# Patient Record
Sex: MALE
[De-identification: names, ages, dates, MRNs, and addresses within clinical notes are randomized per-mention and may not be internally consistent; named-entity substitution may affect disease eponyms.]

---
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
## 2022-08-08 ENCOUNTER — OFFICE VISIT (OUTPATIENT)
Dept: FAMILY MEDICINE CLINIC | Age: 22
End: 2022-08-08
Payer: COMMERCIAL

## 2022-08-08 VITALS
SYSTOLIC BLOOD PRESSURE: 118 MMHG | HEIGHT: 75 IN | DIASTOLIC BLOOD PRESSURE: 68 MMHG | OXYGEN SATURATION: 99 % | HEART RATE: 66 BPM | WEIGHT: 232.3 LBS | BODY MASS INDEX: 28.88 KG/M2

## 2022-08-08 DIAGNOSIS — Z00.00 WELL ADULT EXAM: Primary | ICD-10-CM

## 2022-08-08 DIAGNOSIS — Z02.5 SPORTS PHYSICAL: ICD-10-CM

## 2022-08-08 PROCEDURE — 99395 PREV VISIT EST AGE 18-39: CPT

## 2022-08-08 SDOH — ECONOMIC STABILITY: FOOD INSECURITY: WITHIN THE PAST 12 MONTHS, THE FOOD YOU BOUGHT JUST DIDN'T LAST AND YOU DIDN'T HAVE MONEY TO GET MORE.: NEVER TRUE

## 2022-08-08 SDOH — ECONOMIC STABILITY: FOOD INSECURITY: WITHIN THE PAST 12 MONTHS, YOU WORRIED THAT YOUR FOOD WOULD RUN OUT BEFORE YOU GOT MONEY TO BUY MORE.: NEVER TRUE

## 2022-08-08 ASSESSMENT — ENCOUNTER SYMPTOMS
BLOOD IN STOOL: 0
DIARRHEA: 0
SHORTNESS OF BREATH: 0
CONSTIPATION: 0
CHEST TIGHTNESS: 0

## 2022-08-08 ASSESSMENT — PATIENT HEALTH QUESTIONNAIRE - PHQ9
SUM OF ALL RESPONSES TO PHQ QUESTIONS 1-9: 0
SUM OF ALL RESPONSES TO PHQ9 QUESTIONS 1 & 2: 0
SUM OF ALL RESPONSES TO PHQ QUESTIONS 1-9: 0
SUM OF ALL RESPONSES TO PHQ QUESTIONS 1-9: 0
1. LITTLE INTEREST OR PLEASURE IN DOING THINGS: 0
2. FEELING DOWN, DEPRESSED OR HOPELESS: 0
SUM OF ALL RESPONSES TO PHQ QUESTIONS 1-9: 0

## 2022-08-08 ASSESSMENT — SOCIAL DETERMINANTS OF HEALTH (SDOH): HOW HARD IS IT FOR YOU TO PAY FOR THE VERY BASICS LIKE FOOD, HOUSING, MEDICAL CARE, AND HEATING?: NOT HARD AT ALL

## 2022-08-08 NOTE — PROGRESS NOTES
Food in the Last Year: Never true   Transportation Needs: Not on file   Physical Activity: Not on file   Stress: Not on file   Social Connections: Not on file   Intimate Partner Violence: Not on file   Housing Stability: Not on file       Prior to Visit Medications    Medication Sig Taking? Authorizing Provider   cetirizine (ZYRTEC) 10 MG tablet Take 10 mg by mouth daily Yes Historical Provider, MD       Allergies   Allergen Reactions    Nuts [Peanut-Containing Drug Products] Shortness Of Breath and Swelling     Including tree nuts    Banana      Stomach upset, acid    Chicken Allergy     Eggs Or Egg-Derived Products Other (See Comments)     Stomach cramps    Other       legumes       OBJECTIVE:    /68   Pulse 66   Ht (!) 6' 3\" (1.905 m)   Wt 232 lb 4.8 oz (105.4 kg)   SpO2 99%   BMI 29.04 kg/m²     BP Readings from Last 2 Encounters:   08/08/22 118/68   06/07/21 (!) 145/88       Wt Readings from Last 3 Encounters:   08/08/22 232 lb 4.8 oz (105.4 kg) (98 %, Z= 2.11)*   06/07/21 175 lb (79.4 kg) (81 %, Z= 0.88)*   05/18/21 177 lb (80.3 kg) (83 %, Z= 0.94)*     * Growth percentiles are based on CDC (Boys, 2-20 Years) data. Physical Exam  Constitutional:       Appearance: Normal appearance. HENT:      Head: Normocephalic and atraumatic. Right Ear: External ear normal.      Left Ear: External ear normal.      Nose: No congestion. Eyes:      Extraocular Movements: Extraocular movements intact. Pupils: Pupils are equal, round, and reactive to light. Cardiovascular:      Pulses: Normal pulses. Heart sounds: Normal heart sounds. No murmur heard. Pulmonary:      Effort: Pulmonary effort is normal.      Breath sounds: Normal breath sounds. No wheezing. Abdominal:      General: Bowel sounds are normal.   Musculoskeletal:         General: Normal range of motion. Cervical back: Normal range of motion and neck supple. Right lower leg: No edema. Left lower leg: No edema. Skin:     General: Skin is warm and dry. Capillary Refill: Capillary refill takes less than 2 seconds. Neurological:      General: No focal deficit present. Mental Status: He is alert and oriented to person, place, and time. Psychiatric:         Mood and Affect: Mood normal.         Behavior: Behavior normal.         ASSESSMENT/PLAN:    Jordan was seen today for annual exam.    Diagnoses and all orders for this visit:    Well adult exam  Normal well adult exam in office today. Patient follow-up in 1 year for routine office visit. Sports physical  Sports physical paperwork filled out and signed with all appropriate documentation. I did not note any findings in office today that would inhibit the patient from participating in baseball. This document was prepared by a combination of typing and transcription through a voice recognition software.     STEVO Perez - CNP

## 2022-12-28 ENCOUNTER — TELEPHONE (OUTPATIENT)
Dept: FAMILY MEDICINE CLINIC | Age: 22
End: 2022-12-28

## 2022-12-28 NOTE — TELEPHONE ENCOUNTER
Patient's dad informed and he will see how patient does tonight and if he needs an appointment he will call back.

## 2022-12-28 NOTE — TELEPHONE ENCOUNTER
Keyla Wei was catching baseball. Was not wearing cup. Hit b/t the legs. Swelling noted. Denies asymmetry. I spoke w/ . If severe pain, rec ER or urgent care. O/w arrange appt. Ice, rest, supportive underwear.   Pls call dad and see how he would like to proceed re possibly being seen

## 2022-12-28 NOTE — TELEPHONE ENCOUNTER
Eliza White  called and would like to discuss a injury that Dora Hernández has. The pt father asked her to call you.  Daniel's father and the  need an opinion

## 2023-05-28 NOTE — PROGRESS NOTES
Kerry Gould [address], [address]  [place], [place]  P: [phone]       Patient     Will Arreguin  2000    ChiefComplaint     Chief Complaint   Patient presents with    Follow-up     Patient here for follow up on cyst removal of left ear. States that everything is good since last visit. Denies any pain or discomfort. History of Present Illness     Susannah Garcia is an 27year-old male here today for follow-up with his dad regarding the left EAC cholesteatoma. Since last seen by me he has had no issues. Denies otalgia, otorrhea, vertigo, tinnitus. Hearing stable. No complaints.     Past Medical History     Past Medical History:   Diagnosis Date    Seasonal allergies        Past Surgical History     Past Surgical History:   Procedure Laterality Date    CYST REMOVAL  2005    NOSE    TYMPANOPLASTY Left 5/27/2020    EXCISION OF LEFT EAR CANAL MASS, POSSIBLE TYMPANOPLASTY, POSSIBLE SKIN GRAFT, POSSIBLE FASCIAL GRAFT performed by Cruzito Levi DO at [address] [place] [address] History     Family History   Problem Relation Age of Onset    Asthma Father     Hypertension Maternal Grandmother     Diabetes Maternal Grandmother     Hypertension Paternal Grandfather     Diabetes Paternal Grandfather        Social History     Social History     Tobacco Use    Smoking status: Never Smoker    Smokeless tobacco: Never Used   Vaping Use    Vaping Use: Never used   Substance Use Topics    Alcohol use: Never     Alcohol/week: 0.0 standard drinks    Drug use: Never        Allergies     Allergies   Allergen Reactions    Nuts [Peanut-Containing Drug Products] Shortness Of Breath and Swelling     Including tree nuts    Banana      Stomach upset, acid    Chicken Allergy     Eggs Or Egg-Derived Products Other (See Comments)     Stomach cramps    Other       legumes       Medications     Current Outpatient Medications   Medication Sig Dispense Refill    cetirizine (ZYRTEC) 10 MG tablet Take 10 mg by mouth daily       No current facility-administered medications for this visit. Review of Systems     Review of Systems   Constitutional: Negative for appetite change, chills, fatigue, fever and unexpected weight change. HENT: Negative for congestion, ear discharge, ear pain, facial swelling, hearing loss, nosebleeds, postnasal drip, sinus pressure, sneezing, sore throat, tinnitus, trouble swallowing and voice change. Eyes: Negative for itching. Respiratory: Negative for apnea, cough and shortness of breath. Endocrine: Negative for cold intolerance and heat intolerance. Musculoskeletal: Negative for myalgias and neck pain. Skin: Negative for rash. Allergic/Immunologic: Negative for environmental allergies. Neurological: Negative for dizziness and headaches. Psychiatric/Behavioral: Negative for confusion, decreased concentration and sleep disturbance. PhysicalExam     Vitals:    05/18/21 0943   Temp: 98.6 °F (37 °C)   Weight: 177 lb (80.3 kg)   Height: (!) 6' 3\" (1.905 m)       Physical Exam  Constitutional:       General: He is not in acute distress. Appearance: He is well-developed. HENT:      Head: Normocephalic and atraumatic. Right Ear: Tympanic membrane, ear canal and external ear normal. No drainage. No middle ear effusion. Tympanic membrane is not bulging. Tympanic membrane has normal mobility. Left Ear: Tympanic membrane, ear canal and external ear normal. No drainage. No middle ear effusion. Tympanic membrane is not bulging. Tympanic membrane has normal mobility. Nose: Septal deviation present. No mucosal edema or rhinorrhea. Mouth/Throat:      Lips: Pink. Mouth: Mucous membranes are moist.      Tongue: No lesions. Palate: No mass. Pharynx: Uvula midline. Tonsils: No tonsillar exudate. 2+ on the right. 2+ on the left. Eyes:      Pupils: Pupils are equal, round, and reactive to light.    Neck:      Thyroid: No thyroid mass or thyromegaly. Trachea: Trachea and phonation normal.   Cardiovascular:      Pulses: Normal pulses. Pulmonary:      Effort: Pulmonary effort is normal. No accessory muscle usage or respiratory distress. Breath sounds: No stridor. Musculoskeletal:      Cervical back: Full passive range of motion without pain. Lymphadenopathy:      Head:      Right side of head: No submental or submandibular adenopathy. Left side of head: No submental or submandibular adenopathy. Cervical: No cervical adenopathy. Right cervical: No superficial, deep or posterior cervical adenopathy. Left cervical: No superficial, deep or posterior cervical adenopathy. Skin:     General: Skin is warm and dry. Neurological:      Mental Status: He is alert and oriented to person, place, and time. Cranial Nerves: No cranial nerve deficit. Coordination: Coordination normal.      Gait: Gait normal.   Psychiatric:         Thought Content: Thought content normal.         Assessment and Plan     1. Cholesteatoma of external auditory canal, left  -no evidence of recurrence at this time  -EAC normal    Return as needed        Ike Last, DO  5/18/21      Portions of this note were dictated using Dragon.  There may be linguistic errors secondary to the use of this program. no No

## 2023-08-22 ENCOUNTER — OFFICE VISIT (OUTPATIENT)
Dept: FAMILY MEDICINE CLINIC | Age: 23
End: 2023-08-22
Payer: COMMERCIAL

## 2023-08-22 VITALS
HEART RATE: 84 BPM | OXYGEN SATURATION: 98 % | DIASTOLIC BLOOD PRESSURE: 72 MMHG | HEIGHT: 75 IN | SYSTOLIC BLOOD PRESSURE: 126 MMHG | WEIGHT: 225 LBS | BODY MASS INDEX: 27.98 KG/M2

## 2023-08-22 DIAGNOSIS — Z02.5 SPORTS PHYSICAL: Primary | ICD-10-CM

## 2023-08-22 DIAGNOSIS — Z00.00 WELL ADULT EXAM: ICD-10-CM

## 2023-08-22 PROCEDURE — 99395 PREV VISIT EST AGE 18-39: CPT | Performed by: FAMILY MEDICINE

## 2023-08-22 RX ORDER — ESOMEPRAZOLE MAGNESIUM 40 MG/1
40 CAPSULE, DELAYED RELEASE ORAL
COMMUNITY

## 2023-08-22 ASSESSMENT — PATIENT HEALTH QUESTIONNAIRE - PHQ9
1. LITTLE INTEREST OR PLEASURE IN DOING THINGS: 0
SUM OF ALL RESPONSES TO PHQ QUESTIONS 1-9: 0
SUM OF ALL RESPONSES TO PHQ QUESTIONS 1-9: 0
SUM OF ALL RESPONSES TO PHQ9 QUESTIONS 1 & 2: 0
2. FEELING DOWN, DEPRESSED OR HOPELESS: 0
SUM OF ALL RESPONSES TO PHQ QUESTIONS 1-9: 0
SUM OF ALL RESPONSES TO PHQ QUESTIONS 1-9: 0

## 2023-08-22 ASSESSMENT — ENCOUNTER SYMPTOMS: SHORTNESS OF BREATH: 0

## 2023-08-22 NOTE — PROGRESS NOTES
Chief Complaint   Patient presents with    Annual Exam       HPI:  Carlos Luther is a 23 y.o. (: 2000) here today   for annual sports physical.  HPI  Sports physical today. Hx of testicular rupture/fracture. Was hit w/ baseball. Had surgical procedure as well. Prior abn sensation has resolved. No sig pain noted. Prior repair of right testicular fracture. O/w overall doing well. Has been going thru rehab on arm. Hx of ucl injury. Had thickening on prior MRI. Plans on eval at [facility]. Had been seeing PT. No hx of concussions, passing out w/ exercise, sob. No fam hx of sudden cardiac death. Still some occas allergy issues. Hx of EE. Supposed to have GI f/u again. No sig issues swallowing noted. Last egd last yr. Dilation 2 yrs ago    Patient's medications, allergies, past medical, surgical, social and family histories were reviewed and updated as appropriate. ROS:  Review of Systems   Constitutional:  Negative for fever. Respiratory:  Negative for shortness of breath. Cardiovascular:  Negative for chest pain and palpitations. Musculoskeletal:  Negative for arthralgias.          No results found for: LABA1C, [facility]    Past Medical History:   Diagnosis Date    Seasonal allergies     Testicular fracture 2022    right       Family History   Problem Relation Age of Onset    Asthma Father     Hypertension Maternal Grandmother     Diabetes Maternal Grandmother     Hypertension Paternal Grandfather     Diabetes Paternal Grandfather        Social History     Socioeconomic History    Marital status: Single     Spouse name: Not on file    Number of children: Not on file    Years of education: Not on file    Highest education level: Not on file   Occupational History    Not on file   Tobacco Use    Smoking status: Never    Smokeless tobacco: Never   Vaping Use    Vaping Use: Never used   Substance and Sexual Activity    Alcohol use: Never     Alcohol/week: 0.0

## 2024-05-22 ENCOUNTER — OFFICE VISIT (OUTPATIENT)
Dept: FAMILY MEDICINE CLINIC | Age: 24
End: 2024-05-22
Payer: COMMERCIAL

## 2024-05-22 VITALS
OXYGEN SATURATION: 98 % | BODY MASS INDEX: 27.87 KG/M2 | WEIGHT: 223 LBS | DIASTOLIC BLOOD PRESSURE: 82 MMHG | SYSTOLIC BLOOD PRESSURE: 134 MMHG | HEART RATE: 80 BPM

## 2024-05-22 DIAGNOSIS — Z01.818 PREOP EXAMINATION: Primary | ICD-10-CM

## 2024-05-22 DIAGNOSIS — S53.441A TEAR OF ULNAR COLLATERAL LIGAMENT OF RIGHT ELBOW, INITIAL ENCOUNTER: ICD-10-CM

## 2024-05-22 LAB
ANION GAP SERPL CALCULATED.3IONS-SCNC: 11 MMOL/L (ref 3–16)
BUN SERPL-MCNC: 18 MG/DL (ref 7–20)
CALCIUM SERPL-MCNC: 10 MG/DL (ref 8.3–10.6)
CHLORIDE SERPL-SCNC: 101 MMOL/L (ref 99–110)
CO2 SERPL-SCNC: 27 MMOL/L (ref 21–32)
CREAT SERPL-MCNC: 1.1 MG/DL (ref 0.9–1.3)
GFR SERPLBLD CREATININE-BSD FMLA CKD-EPI: >90 ML/MIN/{1.73_M2}
GLUCOSE SERPL-MCNC: 81 MG/DL (ref 70–99)
POTASSIUM SERPL-SCNC: 4.5 MMOL/L (ref 3.5–5.1)
SODIUM SERPL-SCNC: 139 MMOL/L (ref 136–145)

## 2024-05-22 PROCEDURE — 99214 OFFICE O/P EST MOD 30 MIN: CPT

## 2024-05-22 RX ORDER — LANSOPRAZOLE 30 MG/1
30 CAPSULE, DELAYED RELEASE ORAL DAILY
COMMUNITY

## 2024-05-22 ASSESSMENT — PATIENT HEALTH QUESTIONNAIRE - PHQ9
1. LITTLE INTEREST OR PLEASURE IN DOING THINGS: NOT AT ALL
SUM OF ALL RESPONSES TO PHQ QUESTIONS 1-9: 0
SUM OF ALL RESPONSES TO PHQ QUESTIONS 1-9: 0
2. FEELING DOWN, DEPRESSED OR HOPELESS: NOT AT ALL
SUM OF ALL RESPONSES TO PHQ QUESTIONS 1-9: 0
SUM OF ALL RESPONSES TO PHQ QUESTIONS 1-9: 0
SUM OF ALL RESPONSES TO PHQ9 QUESTIONS 1 & 2: 0

## 2024-05-22 ASSESSMENT — ENCOUNTER SYMPTOMS
RESPIRATORY NEGATIVE: 1
GASTROINTESTINAL NEGATIVE: 1

## 2024-05-22 NOTE — PROGRESS NOTES
Jordan Webster  YOB: 2000    Date of Service:  5/22/2024      Wt Readings from Last 2 Encounters:   05/22/24 101.2 kg (223 lb)   08/22/23 102.1 kg (225 lb)       BP Readings from Last 3 Encounters:   05/22/24 134/82   08/22/23 126/72   08/08/22 118/68        Chief Complaint   Patient presents with    Pre-op Exam     Right Elbow by Dr Cao on 5/31/24 at [facility] [address]        Allergies   Allergen Reactions    Ibuprofen Anaphylaxis    Nuts [Peanut-Containing Drug Products] Shortness Of Breath and Swelling     Including tree nuts    Banana      Stomach upset, acid    Chicken Allergy     Egg-Derived Products Other (See Comments)     Stomach cramps    Other       legumes       Outpatient Medications Marked as Taking for the 5/22/24 encounter (Office Visit) with Dorian Guillen APRN - CNP   Medication Sig Dispense Refill    lansoprazole (PREVACID) 30 MG delayed release capsule Take 1 capsule by mouth daily         This patient presents to the office today for a preoperative consultation  at the request of surgeon, Dr. Cao, who plans on performing right ulnar collateral ligament repair on May 31th at [facility].  The current problem began1 year ago, and symptoms have been worsening with time.  Conservative therapy: Therapy.    Planned anesthesia: General   Known anesthesia problems: None   Bleeding risk: No recent or remote history of abnormal bleeding  Personal or FH of DVT/PE: No    Patient objection to receiving blood products: No    Past Medical History:   Diagnosis Date    Seasonal allergies     Testicular fracture 12/27/2022    right       Past Surgical History:   Procedure Laterality Date    CYST REMOVAL  2005    NOSE    ESOPHAGOGASTRODUODENOSCOPY      several.  dilation 2021    TESTICLE SURGERY  12/2022    TYMPANOPLASTY Left 05/27/2020    EXCISION OF LEFT EAR CANAL MASS, POSSIBLE TYMPANOPLASTY, POSSIBLE SKIN GRAFT, POSSIBLE FASCIAL GRAFT performed by Sirisha

## 2024-08-28 ENCOUNTER — TELEMEDICINE (OUTPATIENT)
Dept: FAMILY MEDICINE CLINIC | Age: 24
End: 2024-08-28
Payer: COMMERCIAL

## 2024-08-28 DIAGNOSIS — J34.89 NASAL DRYNESS: Primary | ICD-10-CM

## 2024-08-28 PROCEDURE — 99213 OFFICE O/P EST LOW 20 MIN: CPT

## 2024-08-28 SDOH — ECONOMIC STABILITY: INCOME INSECURITY: HOW HARD IS IT FOR YOU TO PAY FOR THE VERY BASICS LIKE FOOD, HOUSING, MEDICAL CARE, AND HEATING?: NOT HARD AT ALL

## 2024-08-28 SDOH — ECONOMIC STABILITY: FOOD INSECURITY: WITHIN THE PAST 12 MONTHS, YOU WORRIED THAT YOUR FOOD WOULD RUN OUT BEFORE YOU GOT MONEY TO BUY MORE.: NEVER TRUE

## 2024-08-28 SDOH — ECONOMIC STABILITY: FOOD INSECURITY: WITHIN THE PAST 12 MONTHS, THE FOOD YOU BOUGHT JUST DIDN'T LAST AND YOU DIDN'T HAVE MONEY TO GET MORE.: NEVER TRUE

## 2024-08-28 NOTE — PROGRESS NOTES
Jordan Webster, was evaluated through a synchronous (real-time) audio-video encounter. The patient (or guardian if applicable) is aware that this is a billable service, which includes applicable co-pays. This Virtual Visit was conducted with patient's (and/or legal guardian's) consent. Patient identification was verified, and a caregiver was present when appropriate.   The patient was located at Home: [address] [place] [address] [address]  [place] [address]  Provider was located at Home (Appt Dept State): [place]  Confirm you are appropriately licensed, registered, or certified to deliver care in the state where the patient is located as indicated above. If you are not or unsure, please re-schedule the visit: Yes, I confirm.     Jordan Webster (:  2000) is a Established patient, presenting virtually for evaluation of the following:    Patient complaining of dry sinuses x 2 weeks.  Has stopped using his antihistamine and sinus sprays roughly 1 week ago to reduce further drying out his sinus cavity.  Has been using saline sinus sprays.  Denies any sinus pressure, fatigue, headache, fever, ear pain, sore throat.  States he is breathing completely normal but just has dry sinus cavity.  Patient questioning if we have any advice on how to help moisten the sinuses.      Below is the assessment and plan developed based on review of pertinent history, physical exam, labs, studies, and medications.     Assessment & Plan  Nasal dryness    At this time I recommended the patient continue to hold off on taking his antihistamine tablets and sinus sprays.  I did state that he could use his saline sinus rinses as needed.  I recommended adding use of a humidifier at nighttime during sleep as well as during the day if able in his workplace environment to help moisten the sinuses.  Did not appear the patient was exhibiting sinusitis symptoms given the fact he had no other symptoms other than dry sinus cavity.  Advised measures performed  recognition software.    --Dorian Guillen, STEVO - CNP

## 2024-11-04 ENCOUNTER — OFFICE VISIT (OUTPATIENT)
Dept: FAMILY MEDICINE CLINIC | Age: 24
End: 2024-11-04

## 2024-11-04 VITALS
BODY MASS INDEX: 29 KG/M2 | DIASTOLIC BLOOD PRESSURE: 76 MMHG | HEIGHT: 75 IN | WEIGHT: 233.2 LBS | SYSTOLIC BLOOD PRESSURE: 114 MMHG | HEART RATE: 84 BPM | OXYGEN SATURATION: 98 %

## 2024-11-04 DIAGNOSIS — R68.2 DRY MOUTH: Primary | ICD-10-CM

## 2024-11-04 DIAGNOSIS — S31.30XS RUPTURE OF TESTIS, SEQUELA: ICD-10-CM

## 2024-11-04 DIAGNOSIS — J34.89 NOSE DRYNESS: ICD-10-CM

## 2024-11-04 ASSESSMENT — ENCOUNTER SYMPTOMS: SHORTNESS OF BREATH: 0

## 2024-11-04 NOTE — PROGRESS NOTES
Chief Complaint   Patient presents with    dry mouth       HPI:  Jordan Webster is a 23 y.o. (: 2000) here today   for dry mouth and sinuses.  HPI  Was seen as virtual visit few mo ago.  Has had issues w/ sinuses, throat too dry.  Tried humidifier, stopped zyrtec, inc water intake.  No improvement.  Nose really dry.  Feels as if \"no filter.\"  Some sig dryness to throat noted w/ activity.  Has been off zyrtec approx 3 mo.  Thought was window air conditioner in the past, but sxs have not improved since not using.  No sig dryness to eyes.  Does seem hot all the time.  Has tried saline nasal sprays/ rinse, others.      Prior elbow surgery.  Still w/ swelling.  Had been on prednisone after surgery as well.     Patient's medications, allergies, past medical, surgical, social and family histories were reviewed and updated as appropriate.    ROS:  Review of Systems   Constitutional:  Negative for fever.   Respiratory:  Negative for shortness of breath.    Cardiovascular:  Negative for chest pain and palpitations.   Endocrine: Positive for heat intolerance.   Musculoskeletal:  Positive for arthralgias.           No results found for: \"LABA1C\", \"LDLDIRECT\"    Past Medical History:   Diagnosis Date    Seasonal allergies     Testicular fracture 2022    right       Family History   Problem Relation Age of Onset    Asthma Father     Hypertension Maternal Grandmother     Diabetes Maternal Grandmother     Hypertension Paternal Grandfather     Diabetes Paternal Grandfather        Social History     Socioeconomic History    Marital status: Single     Spouse name: Not on file    Number of children: Not on file    Years of education: Not on file    Highest education level: Not on file   Occupational History    Not on file   Tobacco Use    Smoking status: Never    Smokeless tobacco: Never   Vaping Use    Vaping status: Never Used   Substance and Sexual Activity    Alcohol use: Never     Alcohol/week: 0.0 standard

## 2024-11-08 ENCOUNTER — LAB (OUTPATIENT)
Dept: FAMILY MEDICINE CLINIC | Age: 24
End: 2024-11-08

## 2024-11-08 DIAGNOSIS — S31.30XS RUPTURE OF TESTIS, SEQUELA: ICD-10-CM

## 2024-11-08 DIAGNOSIS — R68.2 DRY MOUTH: ICD-10-CM

## 2024-11-08 DIAGNOSIS — J34.89 NOSE DRYNESS: ICD-10-CM

## 2024-11-08 LAB
ALBUMIN SERPL-MCNC: 4.3 G/DL (ref 3.4–5)
ALBUMIN/GLOB SERPL: 1.8 {RATIO} (ref 1.1–2.2)
ALP SERPL-CCNC: 91 U/L (ref 40–129)
ALT SERPL-CCNC: 29 U/L (ref 10–40)
ANION GAP SERPL CALCULATED.3IONS-SCNC: 9 MMOL/L (ref 3–16)
AST SERPL-CCNC: 26 U/L (ref 15–37)
BASOPHILS # BLD: 0.1 K/UL (ref 0–0.2)
BASOPHILS NFR BLD: 1.3 %
BILIRUB SERPL-MCNC: 0.3 MG/DL (ref 0–1)
BUN SERPL-MCNC: 16 MG/DL (ref 7–20)
CALCIUM SERPL-MCNC: 9.9 MG/DL (ref 8.3–10.6)
CHLORIDE SERPL-SCNC: 105 MMOL/L (ref 99–110)
CO2 SERPL-SCNC: 26 MMOL/L (ref 21–32)
CREAT SERPL-MCNC: 1.1 MG/DL (ref 0.9–1.3)
CRP SERPL-MCNC: <3 MG/L (ref 0–5.1)
DEPRECATED RDW RBC AUTO: 13 % (ref 12.4–15.4)
EOSINOPHIL # BLD: 0.3 K/UL (ref 0–0.6)
EOSINOPHIL NFR BLD: 5.3 %
ERYTHROCYTE [SEDIMENTATION RATE] IN BLOOD BY WESTERGREN METHOD: 2 MM/HR (ref 0–15)
GFR SERPLBLD CREATININE-BSD FMLA CKD-EPI: >90 ML/MIN/{1.73_M2}
GLUCOSE SERPL-MCNC: 129 MG/DL (ref 70–99)
HCT VFR BLD AUTO: 41.9 % (ref 40.5–52.5)
HGB BLD-MCNC: 14 G/DL (ref 13.5–17.5)
LYMPHOCYTES # BLD: 1.9 K/UL (ref 1–5.1)
LYMPHOCYTES NFR BLD: 31.9 %
MCH RBC QN AUTO: 28.5 PG (ref 26–34)
MCHC RBC AUTO-ENTMCNC: 33.4 G/DL (ref 31–36)
MCV RBC AUTO: 85.2 FL (ref 80–100)
MONOCYTES # BLD: 0.4 K/UL (ref 0–1.3)
MONOCYTES NFR BLD: 6.2 %
NEUTROPHILS # BLD: 3.3 K/UL (ref 1.7–7.7)
NEUTROPHILS NFR BLD: 55.3 %
PLATELET # BLD AUTO: 221 K/UL (ref 135–450)
PMV BLD AUTO: 9.3 FL (ref 5–10.5)
POTASSIUM SERPL-SCNC: 4.4 MMOL/L (ref 3.5–5.1)
PROT SERPL-MCNC: 6.7 G/DL (ref 6.4–8.2)
RBC # BLD AUTO: 4.92 M/UL (ref 4.2–5.9)
SODIUM SERPL-SCNC: 140 MMOL/L (ref 136–145)
T4 FREE SERPL-MCNC: 1.2 NG/DL (ref 0.9–1.8)
TSH SERPL DL<=0.005 MIU/L-ACNC: 1.6 UIU/ML (ref 0.27–4.2)
WBC # BLD AUTO: 5.9 K/UL (ref 4–11)

## 2024-11-08 NOTE — PROGRESS NOTES
Blood drawn per order.  Needle size: 21 g  Site: L Antecubital.  First attempt successful Yes    Second attempt no    Pressure applied until bleeding stopped.  Pressure applied.    Patient informed to call office or return if bleeding reoccurs and unable to stop.    Tubes drawn: 1 purple     3 red

## 2024-11-09 LAB — ANA SER QL IA: NEGATIVE

## 2024-11-13 LAB
SHBG SERPL-SCNC: 19 NMOL/L (ref 10–60)
TESTOST FREE SERPL-MCNC: 79 PG/ML (ref 47–244)
TESTOST SERPL-MCNC: 303 NG/DL (ref 249–836)

## 2024-11-27 ENCOUNTER — OFFICE VISIT (OUTPATIENT)
Dept: ENT CLINIC | Age: 24
End: 2024-11-27
Payer: COMMERCIAL

## 2024-11-27 VITALS
BODY MASS INDEX: 27.73 KG/M2 | WEIGHT: 223 LBS | HEART RATE: 83 BPM | HEIGHT: 75 IN | DIASTOLIC BLOOD PRESSURE: 77 MMHG | SYSTOLIC BLOOD PRESSURE: 151 MMHG

## 2024-11-27 DIAGNOSIS — R68.2 DRY MOUTH: ICD-10-CM

## 2024-11-27 DIAGNOSIS — H61.21 IMPACTED CERUMEN OF RIGHT EAR: ICD-10-CM

## 2024-11-27 DIAGNOSIS — J34.89 DRY NOSE: Primary | ICD-10-CM

## 2024-11-27 PROCEDURE — 69210 REMOVE IMPACTED EAR WAX UNI: CPT | Performed by: OTOLARYNGOLOGY

## 2024-11-27 PROCEDURE — 99203 OFFICE O/P NEW LOW 30 MIN: CPT | Performed by: OTOLARYNGOLOGY

## 2024-11-27 RX ORDER — MELOXICAM 15 MG/1
15 TABLET ORAL DAILY
COMMUNITY
Start: 2024-11-04

## 2024-11-27 RX ORDER — PANTOPRAZOLE SODIUM 40 MG/1
40 TABLET, DELAYED RELEASE ORAL DAILY
COMMUNITY

## 2024-11-27 ASSESSMENT — ENCOUNTER SYMPTOMS
SINUS PRESSURE: 0
APNEA: 0
FACIAL SWELLING: 0
VOICE CHANGE: 0
COUGH: 0
EYE ITCHING: 0
SHORTNESS OF BREATH: 0
SORE THROAT: 0
TROUBLE SWALLOWING: 0

## 2024-11-27 NOTE — PROGRESS NOTES
[facility]  [address], [address]  [place] [address]  P: [phone]       Patient     Jordan Webster  2000    ChiefComplaint     Chief Complaint   Patient presents with    Sinus Problem     Started late July, feel like nose is dry, dry mouth.  Tried nasal sprays, humidfier, allergy meds. Does have EOE       History of Present Illness     Daniel is a 23-year-old male here today with his dad for evaluation of dry nose and dry mouth.  Started in July 2024 no new medications.  Has tried nasal saline spray medicated nasal sprays all without improvement.  Has been using humidifier of dryness is better in the morning worsens throughout the day had noticed worsening with preworkout has stopped this and has had some improvement in symptoms.  Drinks approximately 60 ounces of water per day.  Did have RADHA screening done which was negative.    Past Medical History     Past Medical History:   Diagnosis Date    Seasonal allergies     Testicular fracture 12/27/2022    right       Past Surgical History     Past Surgical History:   Procedure Laterality Date    CYST REMOVAL  2005    NOSE    ELBOW SURGERY Right 05/2024    ESOPHAGOGASTRODUODENOSCOPY      several.  dilation 2021    TESTICLE SURGERY  12/2022    TYMPANOPLASTY Left 05/27/2020    EXCISION OF LEFT EAR CANAL MASS, POSSIBLE TYMPANOPLASTY, POSSIBLE SKIN GRAFT, POSSIBLE FASCIAL GRAFT performed by Sirisha Turner DO at [facility] OR       Family History     Family History   Problem Relation Age of Onset    Asthma Father     Hypertension Maternal Grandmother     Diabetes Maternal Grandmother     Hypertension Paternal Grandfather     Diabetes Paternal Grandfather        Social History     Social History     Tobacco Use    Smoking status: Never    Smokeless tobacco: Never   Vaping Use    Vaping status: Never Used   Substance Use Topics    Alcohol use: Never     Alcohol/week: 0.0 standard drinks of alcohol    Drug use: Never        Allergies     Allergies

## 2024-12-05 ENCOUNTER — OFFICE VISIT (OUTPATIENT)
Dept: FAMILY MEDICINE CLINIC | Age: 24
End: 2024-12-05

## 2024-12-05 VITALS
TEMPERATURE: 98.3 F | WEIGHT: 232.6 LBS | DIASTOLIC BLOOD PRESSURE: 72 MMHG | HEART RATE: 83 BPM | HEIGHT: 75 IN | SYSTOLIC BLOOD PRESSURE: 124 MMHG | BODY MASS INDEX: 28.92 KG/M2 | OXYGEN SATURATION: 98 %

## 2024-12-05 DIAGNOSIS — Z01.818 PREOP EXAMINATION: Primary | ICD-10-CM

## 2024-12-05 DIAGNOSIS — K21.9 GASTROESOPHAGEAL REFLUX DISEASE WITHOUT ESOPHAGITIS: ICD-10-CM

## 2024-12-05 DIAGNOSIS — S53.31XD TRAUMATIC RUPTURE OF RIGHT ULNAR COLLATERAL LIGAMENT, SUBSEQUENT ENCOUNTER: ICD-10-CM

## 2024-12-05 DIAGNOSIS — M25.521 PAIN IN RIGHT ELBOW: ICD-10-CM

## 2024-12-05 DIAGNOSIS — Z91.018 NUT ALLERGY: ICD-10-CM

## 2024-12-05 DIAGNOSIS — G56.21 ULNAR NERVE ENTRAPMENT AT ELBOW, RIGHT: ICD-10-CM

## 2024-12-05 RX ORDER — EPINEPHRINE 0.3 MG/.3ML
0.3 INJECTION SUBCUTANEOUS PRN
Qty: 2 EACH | Refills: 1 | Status: SHIPPED | OUTPATIENT
Start: 2024-12-05

## 2024-12-05 RX ORDER — EPINEPHRINE 0.3 MG/.3ML
0.3 INJECTION SUBCUTANEOUS PRN
COMMUNITY
End: 2024-12-05 | Stop reason: SDUPTHER

## 2024-12-05 NOTE — PROGRESS NOTES
Jordan Webster (:  2000) is a 23 y.o. male,Established patient, here for evaluation of the following chief complaint(s):  No chief complaint on file.         Assessment & Plan  Preop examination    Scheduled for revision of right elbow previous Tom Hosea surgery has developed ulnar nerve entrapment.  2024. Low risk orthopedic procedure.          Pain in right elbow    Surgery scheduled 2024 no medical contraindications         Ulnar nerve entrapment at elbow, right    Surgery scheduled 2024 no medical contraindications         Traumatic rupture of right ulnar collateral ligament, subsequent encounter    Surgery scheduled 2024 no medical contraindications         Gastroesophageal reflux disease without esophagitis   Chronic, at goal (stable), continue pantoprazole         Nut allergy    Renewed EpiPen    Orders:    EPINEPHrine (EPIPEN 2-NIMISHA) 0.3 MG/0.3ML SOAJ injection; Inject 0.3 mLs into the muscle as needed (for anaphylaxis with food allergy) Use as directed for allergic reaction      No follow-ups on file.       Subjective   HPI  23 year old male presents for preop evaluation. Revision right elbow, nerve entrapment. Previous \"Tom Hosea surgery\"  No recent illness. No changes to medical history.   Review of Systems   No fevers chills headache chest pain shortness of breath nausea vomiting diarrhea constipation change in bowel or bladder function    Objective   Physical Exam  Vitals reviewed.   HENT:      Head: Normocephalic and atraumatic.      Nose: Nose normal.      Mouth/Throat:      Mouth: Mucous membranes are moist.      Pharynx: Oropharynx is clear.   Eyes:      Extraocular Movements: Extraocular movements intact.      Conjunctiva/sclera: Conjunctivae normal.   Neck:      Comments: Trachea midline  Cardiovascular:      Rate and Rhythm: Normal rate and regular rhythm.      Pulses: Normal pulses.      Heart sounds: Normal heart sounds.   Pulmonary:      Effort:

## 2025-07-02 ENCOUNTER — OFFICE VISIT (OUTPATIENT)
Dept: FAMILY MEDICINE CLINIC | Age: 25
End: 2025-07-02

## 2025-07-02 VITALS
WEIGHT: 230 LBS | HEART RATE: 98 BPM | SYSTOLIC BLOOD PRESSURE: 134 MMHG | DIASTOLIC BLOOD PRESSURE: 72 MMHG | OXYGEN SATURATION: 98 % | HEIGHT: 75 IN | BODY MASS INDEX: 28.6 KG/M2

## 2025-07-02 DIAGNOSIS — H60.332 ACUTE SWIMMER'S EAR OF LEFT SIDE: Primary | ICD-10-CM

## 2025-07-02 RX ORDER — CLOMIPHENE CITRATE 50 MG/1
25 TABLET ORAL DAILY
COMMUNITY

## 2025-07-02 RX ORDER — NEOMYCIN SULFATE, POLYMYXIN B SULFATE AND HYDROCORTISONE 3.5; 10000; 1 MG/ML; [IU]/ML; MG/ML
4 SOLUTION AURICULAR (OTIC) 3 TIMES DAILY
Qty: 10 ML | Refills: 0 | Status: SHIPPED | OUTPATIENT
Start: 2025-07-02 | End: 2025-07-12

## 2025-07-02 SDOH — ECONOMIC STABILITY: FOOD INSECURITY: WITHIN THE PAST 12 MONTHS, YOU WORRIED THAT YOUR FOOD WOULD RUN OUT BEFORE YOU GOT MONEY TO BUY MORE.: NEVER TRUE

## 2025-07-02 SDOH — ECONOMIC STABILITY: FOOD INSECURITY: WITHIN THE PAST 12 MONTHS, THE FOOD YOU BOUGHT JUST DIDN'T LAST AND YOU DIDN'T HAVE MONEY TO GET MORE.: NEVER TRUE

## 2025-07-02 ASSESSMENT — PATIENT HEALTH QUESTIONNAIRE - PHQ9
SUM OF ALL RESPONSES TO PHQ QUESTIONS 1-9: 0
SUM OF ALL RESPONSES TO PHQ QUESTIONS 1-9: 0
2. FEELING DOWN, DEPRESSED OR HOPELESS: NOT AT ALL
SUM OF ALL RESPONSES TO PHQ QUESTIONS 1-9: 0
SUM OF ALL RESPONSES TO PHQ QUESTIONS 1-9: 0
1. LITTLE INTEREST OR PLEASURE IN DOING THINGS: NOT AT ALL

## 2025-07-02 NOTE — PROGRESS NOTES
Jordan Webster (:  2000) is a 24 y.o. male,Established patient, here for evaluation of the following chief complaint(s):  Ear Pain (Left)           Assessment & Plan  Acute swimmer's ear of left side   Use eardrops as prescribed.  You may use a wick if it is not making adequate contact to the ear canal.  If symptoms persist follow-up here or with ENT.    Orders:    neomycin-polymyxin-hydrocortisone (CORTISPORIN) 3.5-89911-3 otic solution; Place 4 drops into the left ear 3 times daily for 10 days Instill into left Ear        ICD-10-CM    1. Acute swimmer's ear of left side  H60.332 neomycin-polymyxin-hydrocortisone (CORTISPORIN) 3.5-40947-0 otic solution        Return if symptoms worsen or fail to improve.       Subjective   HPI  24-year-old male presents with 1 day history of left ear pain.  Started last evening worse this morning.  He has treated it with ibuprofen.  Indicates he went swimming for the first time in a long time yesterday he did get water in the ear.  Does have a history of previous cyst in that ear.    Prior to Visit Medications    Medication Sig Taking? Authorizing Provider   clomiPHENE (CLOMID) 50 MG tablet Take 0.5 tablets by mouth daily Yes Alysia Felder MD   neomycin-polymyxin-hydrocortisone (CORTISPORIN) 3.5-53780-7 otic solution Place 4 drops into the left ear 3 times daily for 10 days Instill into left Ear Yes Jamey Christianson MD   EPINEPHrine (EPIPEN 2-NIMISHA) 0.3 MG/0.3ML SOAJ injection Inject 0.3 mLs into the muscle as needed (for anaphylaxis with food allergy) Use as directed for allergic reaction Yes Jamey Christianson MD   pantoprazole (PROTONIX) 40 MG tablet Take 1 tablet by mouth daily Yes Alysia Felder MD      Review of Systems   Constitutional:  Negative for chills and fever.          Objective   Vitals:    25 1018   BP: 134/72   Pulse: 98   SpO2: 98%     Physical Exam  Constitutional:       Appearance: Normal appearance.   HENT:      Head: Normocephalic

## 2025-07-28 ENCOUNTER — OFFICE VISIT (OUTPATIENT)
Dept: FAMILY MEDICINE CLINIC | Age: 25
End: 2025-07-28
Payer: COMMERCIAL

## 2025-07-28 VITALS
OXYGEN SATURATION: 98 % | HEART RATE: 70 BPM | SYSTOLIC BLOOD PRESSURE: 137 MMHG | HEIGHT: 75 IN | DIASTOLIC BLOOD PRESSURE: 77 MMHG | BODY MASS INDEX: 28.6 KG/M2 | RESPIRATION RATE: 20 BRPM | WEIGHT: 230 LBS

## 2025-07-28 DIAGNOSIS — Z00.00 ENCOUNTER FOR WELL ADULT EXAM WITHOUT ABNORMAL FINDINGS: Primary | ICD-10-CM

## 2025-07-28 PROCEDURE — 99395 PREV VISIT EST AGE 18-39: CPT | Performed by: FAMILY MEDICINE

## 2025-07-28 RX ORDER — LANSOPRAZOLE 30 MG/1
TABLET, ORALLY DISINTEGRATING, DELAYED RELEASE ORAL
COMMUNITY
Start: 2025-05-16

## 2025-07-28 ASSESSMENT — ENCOUNTER SYMPTOMS
DIARRHEA: 0
SHORTNESS OF BREATH: 0
NAUSEA: 0
VOMITING: 0
CONSTIPATION: 0

## 2025-07-28 NOTE — PROGRESS NOTES
Jordan Webster (:  2000) is a 24 y.o. male,Established patient, here for evaluation of the following chief complaint(s):  Annual Exam           Assessment & Plan  Encounter for well adult exam without abnormal findings   Patient is playing college baseball.  Is here for sports physical.  No complaints at this time.  Reviewed medical surgical social family history.  Cleared to proceed with the exception of he still needs orthopedic clearance for his right elbow following surgical repair.             ICD-10-CM    1. Encounter for well adult exam without abnormal findings  Z00.00         Return if symptoms worsen or fail to improve.       Subjective   HPI  24-year-old male is seeking clearance to resume baseball in college.  He has been out for the past 2 years due to elbow injury.  Pending final clearance by orthopedics.  He has no acute complaints today    Prior to Visit Medications    Medication Sig Taking? Authorizing Provider   lansoprazole (PREVACID SOLUTAB) 30 MG disintegrating tablet  Yes ProviderAlysia MD   clomiPHENE (CLOMID) 50 MG tablet Take 0.5 tablets by mouth daily Yes Provider, MD Alysia   EPINEPHrine (EPIPEN 2-NIMISHA) 0.3 MG/0.3ML SOAJ injection Inject 0.3 mLs into the muscle as needed (for anaphylaxis with food allergy) Use as directed for allergic reaction Yes Jamey Christianson MD      Review of Systems   Constitutional:  Negative for chills and fever.   Respiratory:  Negative for shortness of breath.    Cardiovascular:  Negative for chest pain.   Gastrointestinal:  Negative for constipation, diarrhea, nausea and vomiting.   Genitourinary:  Negative for dysuria.   Skin:  Negative for rash.   Neurological:  Negative for headaches.          Objective   Vitals:    25 1504   BP: 137/77   Pulse: 70   Resp: 20   SpO2: 98%     Physical Exam  Constitutional:       Appearance: Normal appearance.   HENT:      Head: Normocephalic and atraumatic.   Eyes:      Extraocular Movements:

## (undated) DEVICE — MAGNETIC DRAPE: Brand: DEVON

## (undated) DEVICE — SOLUTION IV 1000ML 0.9% SOD CHL

## (undated) DEVICE — INTEGRA® JARIT® FRAZIER SUCTION TUBE, 7-1/4", ANGLED, 12 FRENCH, 4" WORKING LENGTH: Brand: INTEGRA® JARIT®

## (undated) DEVICE — DRAPE: MAGNETIC 12X16 30/CS: Brand: MEDICAL ACTION INDUSTRIES

## (undated) DEVICE — PACK,EENT,TURBAN DRAPE,PK II: Brand: MEDLINE

## (undated) DEVICE — BANDAGE,ADHESIVE,PLASTIC,1"X3",ST,LF: Brand: MEDLINE

## (undated) DEVICE — ELECTROSURGICAL PENCIL ROCKER SWITCH NON COATED BLADE ELECTRODE 10 FT (3 M) CORD HOLSTER: Brand: MEGADYNE

## (undated) DEVICE — TRAY PREP DRY W/ PREM GLV 2 APPL 6 SPNG 2 UNDPD 1 OVERWRAP

## (undated) DEVICE — SURGIFOAM SPNG SZ 12-7

## (undated) DEVICE — SYRINGE 60ML BULB IRRIG ST LF

## (undated) DEVICE — INTENDED FOR TISSUE SEPARATION, AND OTHER PROCEDURES THAT REQUIRE A SHARP SURGICAL BLADE TO PUNCTURE OR CUT.: Brand: BARD-PARKER ® CARBON RIB-BACK BLADES

## (undated) DEVICE — BALL COT M DIA1IN WHT

## (undated) DEVICE — GLOVE SURG SZ 6 THK91MIL LTX FREE SYN POLYISOPRENE ANTI

## (undated) DEVICE — Z INACTIVE USE 2735373 APPLICATOR FBR LAIN COT WOOD TIP ECONOMICAL

## (undated) DEVICE — GOWN SIRUS NONREIN XL W/TWL: Brand: MEDLINE INDUSTRIES, INC.

## (undated) DEVICE — SUTURE NONABSORBABLE MONOFILAMENT 5-0 PS-2 18 IN BLK ETHILON 1666H

## (undated) DEVICE — SHEET,DRAPE,53X77,STERILE: Brand: MEDLINE

## (undated) DEVICE — MINOR SET UP: Brand: MEDLINE INDUSTRIES, INC.

## (undated) DEVICE — TELFA NON-ADHERENT ABSORBENT DRESSING: Brand: TELFA

## (undated) DEVICE — ELECTRODE NDL 2.8IN COAT VALLEYLAB

## (undated) DEVICE — NEEDLE 26GAX3/8IN HYPO INTRADERMAL BVL